# Patient Record
Sex: FEMALE | Race: WHITE
[De-identification: names, ages, dates, MRNs, and addresses within clinical notes are randomized per-mention and may not be internally consistent; named-entity substitution may affect disease eponyms.]

---

## 2017-09-12 ENCOUNTER — HOSPITAL ENCOUNTER (EMERGENCY)
Dept: HOSPITAL 10 - FTE | Age: 52
Discharge: HOME | End: 2017-09-12
Payer: COMMERCIAL

## 2017-09-12 VITALS
HEIGHT: 65 IN | WEIGHT: 144.4 LBS | WEIGHT: 144.4 LBS | BODY MASS INDEX: 24.06 KG/M2 | HEIGHT: 65 IN | BODY MASS INDEX: 24.06 KG/M2

## 2017-09-12 DIAGNOSIS — F17.210: ICD-10-CM

## 2017-09-12 DIAGNOSIS — B35.9: Primary | ICD-10-CM

## 2017-09-12 PROCEDURE — 99283 EMERGENCY DEPT VISIT LOW MDM: CPT

## 2017-09-12 NOTE — ERD
ER Documentation


Chief Complaint


Date/Time


DATE: 9/12/17 


TIME: 09:16


Chief Complaint


RASH STARTING ON HANDS AND GENERALIZED TO BODY X2 WEEKS





HPI


52-year-old female complaining of rash to left dorsal hand.  Patient's been 

there for 2 weeks she has been applying cortisone cream and rashes gotten 

worse.  She states the rash is very itchy.  She itches it so hard sometimes it 

bleeds.  Has never had this before.  No one at home has similar rash.  Denies 

fever.  Is right-hand dominant.





ROS


All systems reviewed and are negative except as per history of present illness.





Medications


Home Meds


Active Scripts


Clotrimazole* (Clotrimazole* AF) 1% - 30 Gm Cream.gm., 1 APPLIC TOP BID for 7 

Days, TUB


   Prov:HAIM HARTMAN PA-C         9/12/17


Docusate Sodium* (Colace*) 100 Mg Capsule, 100 MG PO BID, #30 CAP 0 Refills


   Prov:GONZALES ESPINO PA-C         12/20/15





Allergies


Allergies:  


Coded Allergies:  


     No Known Allergy (Unverified , 12/20/15)





PMhx/Soc


History of Surgery:  No


Anesthesia Reaction:  No


Hx Neurological Disorder:  No


Hx Respiratory Disorders:  No


Hx Cardiac Disorders:  No


Hx Psychiatric Problems:  No


Hx Miscellaneous Medical Probl:  Yes (HEMORRHOID)


Hx Alcohol Use:  No


Hx Substance Use:  No


Hx Tobacco Use:  No


Smoking Status:  Current every day smoker





Physical Exam


Vitals





Vital Signs








  Date Time  Temp Pulse Resp B/P Pulse Ox O2 Delivery O2 Flow Rate FiO2


 


9/12/17 06:51 98.1 66 16 131/79 99   








Physical Exam


GENERAL: The patient is well-appearing, well-nourished, in no acute distress


CHEST: Clear to auscultation bilaterally.  There are no rales, wheezes or 

rhonchi.


HEART: Regular rate and rhythm.  No murmurs, clicks, rubs or gallops.  No S3 or 

S4.


EXTREMITIES: Equal pulses bilaterally.  There is no peripheral clubbing, 

cyanosis or edema.  No focal swelling or erythema.  Full range of motion.  

Grossly neurovascularly intact.


NEUROLOGIC: Alert and oriented.  Cranial nerves II through XII intact.  Motor 

strength in all 4 extremities with 5 out of 5 strength.  Sensation grossly 

intact.  Normal speech and gait.  Babinski negative.  DTR 2+ throughout.


SKIN: Erythematous patch noted on the left dorsal hand.  Approximately 2 cm x 3 

cm inside.  Slightly elevated erythema.  No vesicles.  No purulence.  No 

bleeding.





Procedures/MDM


MDM: 52-year-old female complaining of rash to left dorsal hand.  I have low 

suspicion for bacterial infection.  I have low suspicion for parasitic 

infection.  Patient's rash appears to be fungal and will be treated with 

antifungal medication.  Patient is told if symptoms change or worsen to return 

to ER.  Patient is also recommended to have close follow-up within 1-2 days for 

close evaluation with PMD.  All questions answered at discharge





Departure


Diagnosis:  


 Primary Impression:  


 Ringworm


Condition:  Stable


Patient Instructions:  Ringworm, Skin


Referrals:  


RADHA DAVIS MD





Additional Instructions:  


FOLLOW UP WITH YOUR PRIMARY CARE PHYSICIAN TOMORROW.Return to this facility if 

you are not improving as expected.











HAIM HARTMAN PA-C Sep 12, 2017 09:18

## 2018-09-08 ENCOUNTER — HOSPITAL ENCOUNTER (EMERGENCY)
Dept: HOSPITAL 91 - FTE | Age: 53
Discharge: HOME | End: 2018-09-08
Payer: COMMERCIAL

## 2018-09-08 ENCOUNTER — HOSPITAL ENCOUNTER (EMERGENCY)
Age: 53
Discharge: HOME | End: 2018-09-08

## 2018-09-08 DIAGNOSIS — J02.9: Primary | ICD-10-CM

## 2018-09-08 PROCEDURE — 99283 EMERGENCY DEPT VISIT LOW MDM: CPT

## 2021-07-24 ENCOUNTER — HOSPITAL ENCOUNTER (EMERGENCY)
Dept: HOSPITAL 54 - ER | Age: 56
Discharge: HOME | End: 2021-07-24
Payer: COMMERCIAL

## 2021-07-24 VITALS — WEIGHT: 140 LBS | BODY MASS INDEX: 22.5 KG/M2 | HEIGHT: 66 IN

## 2021-07-24 VITALS — DIASTOLIC BLOOD PRESSURE: 75 MMHG | SYSTOLIC BLOOD PRESSURE: 126 MMHG

## 2021-07-24 DIAGNOSIS — L30.9: Primary | ICD-10-CM

## 2021-07-24 DIAGNOSIS — B35.3: ICD-10-CM

## 2021-07-24 NOTE — NUR
patient bibs c/o left foot itching, [place] 1 [place]. 
aox4 , no sob noted. no c/o pain at this time. respirations even and unlabored. 
will ccontinue with plan of care